# Patient Record
Sex: MALE
[De-identification: names, ages, dates, MRNs, and addresses within clinical notes are randomized per-mention and may not be internally consistent; named-entity substitution may affect disease eponyms.]

---

## 2020-04-29 PROBLEM — Z00.00 ENCOUNTER FOR PREVENTIVE HEALTH EXAMINATION: Status: ACTIVE | Noted: 2020-04-29

## 2020-04-30 ENCOUNTER — APPOINTMENT (OUTPATIENT)
Dept: ENDOCRINOLOGY | Facility: CLINIC | Age: 20
End: 2020-04-30
Payer: COMMERCIAL

## 2020-04-30 VITALS
BODY MASS INDEX: 26.36 KG/M2 | HEART RATE: 63 BPM | DIASTOLIC BLOOD PRESSURE: 76 MMHG | HEIGHT: 69 IN | SYSTOLIC BLOOD PRESSURE: 128 MMHG | WEIGHT: 178 LBS

## 2020-04-30 DIAGNOSIS — N62 HYPERTROPHY OF BREAST: ICD-10-CM

## 2020-04-30 DIAGNOSIS — F12.90 CANNABIS USE, UNSPECIFIED, UNCOMPLICATED: ICD-10-CM

## 2020-04-30 DIAGNOSIS — R68.82 DECREASED LIBIDO: ICD-10-CM

## 2020-04-30 PROCEDURE — 99204 OFFICE O/P NEW MOD 45 MIN: CPT

## 2020-05-01 PROBLEM — F12.90 CANNABIS USE WITHOUT COMPLICATION: Status: ACTIVE | Noted: 2020-05-01

## 2020-05-01 NOTE — ASSESSMENT
[FreeTextEntry1] : appears appropriately virilized, age of puberty 11. no s/s of hypogonadism. reassured pt, but he requests full hypothalamic/pituitary/gonadal testing, questionable fat on chest, however no mass under the nipple is present, making gynecomastia unlikely. Verbalized understanding and agrees with treatment plan, will contact MD and seek emergency medical care if condition changes.\par

## 2020-05-01 NOTE — PHYSICAL EXAM
[Alert] : alert [Well Nourished] : well nourished [No Acute Distress] : no acute distress [Normal Sclera/Conjunctiva] : normal sclera/conjunctiva [Well Developed] : well developed [No Proptosis] : no proptosis [Normal Oropharynx] : the oropharynx was normal [EOMI] : extra ocular movement intact [Thyroid Not Enlarged] : the thyroid was not enlarged [No Thyroid Nodules] : no palpable thyroid nodules [No Respiratory Distress] : no respiratory distress [Well Healed Scar] : well healed scar [No Accessory Muscle Use] : no accessory muscle use [Clear to Auscultation] : lungs were clear to auscultation bilaterally [Normal S1, S2] : normal S1 and S2 [No Edema] : no peripheral edema [Normal Rate] : heart rate was normal [Regular Rhythm] : with a regular rhythm [Pedal Pulses Normal] : the pedal pulses are present [No Masses] : no palpable masses [Normal Appearance] : normal in appearance [Normal Bowel Sounds] : normal bowel sounds [No Galactorrhea] : no galactorrhea [No Nipple Discharge] : no nipple discharge [Not Distended] : not distended [Not Tender] : non-tender [Penis Abnormality] : the penis was normal [Soft] : abdomen soft [Testes Mass (___cm)] : there were no testicular masses [Scrotum] : the scrotum was normal [Testes Swelling] : the testicles were not swollen [Normal Testicular Size] : normal testicular size [Normal Pattern Pubic Hair] : normal male pattern pubic hair [No Spinal Tenderness] : no spinal tenderness [Normal Anterior Cervical Nodes] : no anterior cervical lymphadenopathy [Normal Posterior Cervical Nodes] : no posterior cervical lymphadenopathy [Spine Straight] : spine straight [No Stigmata of Cushings Syndrome] : no stigmata of Cushings Syndrome [Normal Gait] : normal gait [No Rash] : no rash [Normal Strength/Tone] : muscle strength and tone were normal [Normal Reflexes] : deep tendon reflexes were 2+ and symmetric [Acanthosis Nigricans] : no acanthosis nigricans [No Tremors] : no tremors [Oriented x3] : oriented to person, place, and time

## 2020-05-01 NOTE — HISTORY OF PRESENT ILLNESS
[FreeTextEntry1] : 20 y/o M w. no PMH\par here for initial evaluation and management of low libido\par generally feels well and endorses no acute complaints.\par reports remote past use of androlone, DHEA supplements as recently as 2019. notes reduced libido and drive. reports conserved morning erections and no erectile dysfunction. notes growth of fat on chest. he otherwise denies any f/c, CP, SOB, palpitations, tremors, depressed mood, anxiety, palpitations, n/v, stool/urinary abn, skin/weight changes, heat/cold intolerance, HAs, breast/nipple changes, polyuria/polydipsia/nocturia or other complaints. \par \par